# Patient Record
Sex: FEMALE | URBAN - METROPOLITAN AREA
[De-identification: names, ages, dates, MRNs, and addresses within clinical notes are randomized per-mention and may not be internally consistent; named-entity substitution may affect disease eponyms.]

---

## 2022-01-11 ENCOUNTER — OFFICE VISIT (OUTPATIENT)
Dept: URBAN - METROPOLITAN AREA CLINIC 56 | Facility: CLINIC | Age: 62
End: 2022-01-11
Payer: OTHER GOVERNMENT

## 2022-01-11 DIAGNOSIS — H11.32 SUBCONJUNCTIVAL HEMORRHAGE OF LEFT EYE: Primary | ICD-10-CM

## 2022-01-11 DIAGNOSIS — H25.13 AGE-RELATED NUCLEAR CATARACT, BILATERAL: ICD-10-CM

## 2022-01-11 PROCEDURE — 92134 CPTRZ OPH DX IMG PST SGM RTA: CPT | Performed by: OPTOMETRIST

## 2022-01-11 PROCEDURE — 92004 COMPRE OPH EXAM NEW PT 1/>: CPT | Performed by: OPTOMETRIST

## 2022-01-11 ASSESSMENT — INTRAOCULAR PRESSURE
OS: 14
OD: 14

## 2022-01-11 ASSESSMENT — KERATOMETRY
OD: 43.66
OS: 43.98

## 2022-01-11 ASSESSMENT — VISUAL ACUITY
OD: 20/20
OS: 20/20

## 2022-01-11 NOTE — IMPRESSION/PLAN
Impression: Subconjunctival hemorrhage of left eye: H11.32. Plan: Discussed findings with patient. If reoccurring frequently recommend following up with PCP. Subconj heme will resolve on its own. Patient is on Warfarin, has INR checked monthly. Patient instructed to call cardiologist if these become frequent.

## 2023-01-16 ENCOUNTER — OFFICE VISIT (OUTPATIENT)
Dept: URBAN - METROPOLITAN AREA CLINIC 56 | Facility: LOCATION | Age: 63
End: 2023-01-16
Payer: OTHER GOVERNMENT

## 2023-01-16 DIAGNOSIS — H25.13 AGE-RELATED NUCLEAR CATARACT, BILATERAL: Primary | ICD-10-CM

## 2023-01-16 DIAGNOSIS — H04.123 TEAR FILM INSUFFICIENCY OF BILATERAL LACRIMAL GLANDS: ICD-10-CM

## 2023-01-16 PROCEDURE — 92014 COMPRE OPH EXAM EST PT 1/>: CPT | Performed by: OPTOMETRIST

## 2023-01-16 ASSESSMENT — INTRAOCULAR PRESSURE
OS: 13
OD: 14

## 2023-01-16 ASSESSMENT — KERATOMETRY
OS: 44.03
OD: 43.52

## 2023-01-16 ASSESSMENT — VISUAL ACUITY
OD: 20/20
OS: 20/20

## 2023-01-16 NOTE — IMPRESSION/PLAN
Impression: Tear film insufficiency of bilateral lacrimal glands: H04.123. Plan: Patient notes dryness in the summer OS>OD. Recommend using AT more frequently when this happens. Set up appointment if problem persists.